# Patient Record
Sex: FEMALE
[De-identification: names, ages, dates, MRNs, and addresses within clinical notes are randomized per-mention and may not be internally consistent; named-entity substitution may affect disease eponyms.]

---

## 2017-03-16 ENCOUNTER — LAB SERVICES (OUTPATIENT)
Dept: OTHER | Age: 37
End: 2017-03-16

## 2017-03-16 LAB
CRP SERPL-MCNC: 1.7 MG/DL (ref 0–1)
SEDIMENTATION RATE, RBC: 41 MM/H (ref 0–20)

## 2017-03-17 LAB
ALBUMIN SERPL BCG-MCNC: 4.1 G/DL (ref 3.6–5.1)
ALP SERPL-CCNC: 91 U/L (ref 45–105)
ALT SERPL W/O P-5'-P-CCNC: 30 U/L (ref 15–43)
AST SERPL-CCNC: 17 U/L (ref 14–43)
BILIRUB SERPL-MCNC: 0.4 MG/DL (ref 0–1.3)
BUN SERPL-MCNC: 13 MG/DL (ref 7–20)
CALCIUM SERPL-MCNC: 9 MG/DL (ref 8.6–10.6)
CHLORIDE SERPL-SCNC: 102 MMOL/L (ref 96–107)
CREATININE, SERUM: 0.6 MG/DL (ref 0.5–1.4)
DIFFERENTIAL TYPE: ABNORMAL
GFR SERPL CREATININE-BSD FRML MDRD: >60 ML/MIN/{1.73M2}
GFR SERPL CREATININE-BSD FRML MDRD: >60 ML/MIN/{1.73M2}
GLUCOSE SERPL-MCNC: 90 MG/DL (ref 70–200)
HCO3 SERPL-SCNC: 28 MMOL/L (ref 22–32)
HEMATOCRIT: 42.3 % (ref 34–45)
HEMOGLOBIN: 14.1 G/DL (ref 11.2–15.7)
LYMPH PERCENT: 48.8 % (ref 20.5–51.1)
LYMPHOCYTE ABSOLUTE #: 4.9 10*3/UL (ref 1.2–3.4)
MEAN CORPUSCULAR HGB CONCENTRATION: 33.3 % (ref 32–36)
MEAN CORPUSCULAR HGB: 29.3 PG (ref 27–34)
MEAN CORPUSCULAR VOLUME: 87.8 FL (ref 79–95)
MEAN PLATELET VOLUME: 9.9 FL (ref 8.6–12.4)
MIXED %: 9.3 % (ref 4.3–12.9)
MIXED ABSOLUTE #: 0.9 10*3/UL (ref 0.2–0.9)
NEUTROPHIL ABSOLUTE #: 4.3 10*3/UL (ref 1.4–6.5)
NEUTROPHIL PERCENT: 41.9 % (ref 34–73.5)
PLATELET COUNT: 319 10*3/UL (ref 150–400)
POTASSIUM SERPL-SCNC: 4.1 MMOL/L (ref 3.5–5.3)
PROT SERPL-MCNC: 7.5 G/DL (ref 6.4–8.5)
RED BLOOD CELL COUNT: 4.82 10*6/UL (ref 3.7–5.2)
RED CELL DISTRIBUTION WIDTH: 12.7 % (ref 11.3–14.8)
SODIUM SERPL-SCNC: 142 MMOL/L (ref 136–146)
WHITE BLOOD CELL COUNT: 10.1 10*3/UL (ref 4–10)

## 2017-03-20 ENCOUNTER — CHARTING TRANS (OUTPATIENT)
Dept: RHEUMATOLOGY | Age: 37
End: 2017-03-20

## 2017-06-08 ENCOUNTER — LAB SERVICES (OUTPATIENT)
Dept: OTHER | Age: 37
End: 2017-06-08

## 2017-06-08 LAB
CRP SERPL-MCNC: 2.4 MG/DL (ref 0–1)
SEDIMENTATION RATE, RBC: 55 MM/H (ref 0–20)

## 2017-06-12 ENCOUNTER — CHARTING TRANS (OUTPATIENT)
Dept: RHEUMATOLOGY | Age: 37
End: 2017-06-12

## 2018-07-26 ENCOUNTER — HOSPITAL ENCOUNTER (EMERGENCY)
Facility: HOSPITAL | Age: 38
Discharge: HOME | End: 2018-07-27
Attending: STUDENT IN AN ORGANIZED HEALTH CARE EDUCATION/TRAINING PROGRAM
Payer: COMMERCIAL

## 2018-07-26 DIAGNOSIS — N83.201 RIGHT OVARIAN CYST: Primary | ICD-10-CM

## 2018-07-26 DIAGNOSIS — K59.00 CONSTIPATION, UNSPECIFIED CONSTIPATION TYPE: ICD-10-CM

## 2018-07-26 DIAGNOSIS — M54.9 BACK PAIN WITH RADIATION: ICD-10-CM

## 2018-07-26 DIAGNOSIS — R10.9 ABDOMINAL PAIN, ACUTE: ICD-10-CM

## 2018-07-26 LAB
ALBUMIN SERPL-MCNC: 3.1 G/DL (ref 3.5–4.8)
ALBUMIN/GLOB SERPL: 0.8 {RATIO} (ref 1–2)
ALP LIVER SERPL-CCNC: 82 U/L (ref 37–98)
ALT SERPL-CCNC: 16 U/L (ref 14–54)
ANION GAP SERPL CALC-SCNC: 6 MMOL/L (ref 0–18)
AST SERPL-CCNC: 7 U/L (ref 15–41)
BASOPHILS # BLD AUTO: 0.03 X10(3) UL (ref 0–0.1)
BASOPHILS NFR BLD AUTO: 0.3 %
BILIRUB SERPL-MCNC: 0.3 MG/DL (ref 0.1–2)
BILIRUB UR QL STRIP.AUTO: NEGATIVE
BUN BLD-MCNC: 8 MG/DL (ref 8–20)
BUN/CREAT SERPL: 12.1 (ref 10–20)
CALCIUM BLD-MCNC: 8.5 MG/DL (ref 8.3–10.3)
CHLORIDE SERPL-SCNC: 106 MMOL/L (ref 101–111)
CO2 SERPL-SCNC: 28 MMOL/L (ref 22–32)
COLOR UR AUTO: YELLOW
CREAT BLD-MCNC: 0.66 MG/DL (ref 0.55–1.02)
EOSINOPHIL # BLD AUTO: 0.21 X10(3) UL (ref 0–0.3)
EOSINOPHIL NFR BLD AUTO: 2 %
ERYTHROCYTE [DISTWIDTH] IN BLOOD BY AUTOMATED COUNT: 13.7 % (ref 11.5–16)
GLOBULIN PLAS-MCNC: 4.1 G/DL (ref 2.5–3.7)
GLUCOSE BLD-MCNC: 98 MG/DL (ref 70–99)
GLUCOSE UR STRIP.AUTO-MCNC: NEGATIVE MG/DL
HCT VFR BLD AUTO: 39.1 % (ref 34–50)
HGB BLD-MCNC: 12.8 G/DL (ref 12–16)
IMMATURE GRANULOCYTE COUNT: 0.03 X10(3) UL (ref 0–1)
IMMATURE GRANULOCYTE RATIO %: 0.3 %
KETONES UR STRIP.AUTO-MCNC: NEGATIVE MG/DL
LIPASE: 86 U/L (ref 73–393)
LYMPHOCYTES # BLD AUTO: 4.89 X10(3) UL (ref 0.9–4)
LYMPHOCYTES NFR BLD AUTO: 46.7 %
M PROTEIN MFR SERPL ELPH: 7.2 G/DL (ref 6.1–8.3)
MCH RBC QN AUTO: 27.6 PG (ref 27–33.2)
MCHC RBC AUTO-ENTMCNC: 32.7 G/DL (ref 31–37)
MCV RBC AUTO: 84.3 FL (ref 81–100)
MONOCYTES # BLD AUTO: 0.47 X10(3) UL (ref 0.1–1)
MONOCYTES NFR BLD AUTO: 4.5 %
NEUTROPHIL ABS PRELIM: 4.84 X10 (3) UL (ref 1.3–6.7)
NEUTROPHILS # BLD AUTO: 4.84 X10(3) UL (ref 1.3–6.7)
NEUTROPHILS NFR BLD AUTO: 46.2 %
NITRITE UR QL STRIP.AUTO: NEGATIVE
OSMOLALITY SERPL CALC.SUM OF ELEC: 288 MOSM/KG (ref 275–295)
PH UR STRIP.AUTO: 6 [PH] (ref 4.5–8)
PLATELET # BLD AUTO: 340 10(3)UL (ref 150–450)
POCT URINE PREGNANCY: NEGATIVE
POTASSIUM SERPL-SCNC: 4.1 MMOL/L (ref 3.6–5.1)
PROT UR STRIP.AUTO-MCNC: NEGATIVE MG/DL
RBC # BLD AUTO: 4.64 X10(6)UL (ref 3.8–5.1)
RBC UR QL AUTO: NEGATIVE
RED CELL DISTRIBUTION WIDTH-SD: 41.6 FL (ref 35.1–46.3)
SODIUM SERPL-SCNC: 140 MMOL/L (ref 136–144)
SP GR UR STRIP.AUTO: 1.01 (ref 1–1.03)
UROBILINOGEN UR STRIP.AUTO-MCNC: <2 MG/DL
WBC # BLD AUTO: 10.5 X10(3) UL (ref 4–13)

## 2018-07-26 PROCEDURE — 96361 HYDRATE IV INFUSION ADD-ON: CPT

## 2018-07-26 PROCEDURE — 85025 COMPLETE CBC W/AUTO DIFF WBC: CPT | Performed by: STUDENT IN AN ORGANIZED HEALTH CARE EDUCATION/TRAINING PROGRAM

## 2018-07-26 PROCEDURE — 81025 URINE PREGNANCY TEST: CPT

## 2018-07-26 PROCEDURE — 80053 COMPREHEN METABOLIC PANEL: CPT | Performed by: STUDENT IN AN ORGANIZED HEALTH CARE EDUCATION/TRAINING PROGRAM

## 2018-07-26 PROCEDURE — 96375 TX/PRO/DX INJ NEW DRUG ADDON: CPT

## 2018-07-26 PROCEDURE — 96374 THER/PROPH/DIAG INJ IV PUSH: CPT

## 2018-07-26 PROCEDURE — 99284 EMERGENCY DEPT VISIT MOD MDM: CPT

## 2018-07-26 PROCEDURE — 87086 URINE CULTURE/COLONY COUNT: CPT | Performed by: STUDENT IN AN ORGANIZED HEALTH CARE EDUCATION/TRAINING PROGRAM

## 2018-07-26 PROCEDURE — 83690 ASSAY OF LIPASE: CPT | Performed by: STUDENT IN AN ORGANIZED HEALTH CARE EDUCATION/TRAINING PROGRAM

## 2018-07-26 PROCEDURE — 81001 URINALYSIS AUTO W/SCOPE: CPT | Performed by: STUDENT IN AN ORGANIZED HEALTH CARE EDUCATION/TRAINING PROGRAM

## 2018-07-26 RX ORDER — CLONAZEPAM 1 MG/1
1 TABLET ORAL 2 TIMES DAILY PRN
COMMUNITY
End: 2020-12-08

## 2018-07-26 RX ORDER — GUANFACINE 1 MG/1
1 TABLET ORAL NIGHTLY
COMMUNITY
End: 2018-11-13

## 2018-07-26 RX ORDER — PROPRANOLOL HYDROCHLORIDE 80 MG/1
80 TABLET ORAL 3 TIMES DAILY
COMMUNITY
End: 2020-12-08

## 2018-07-26 RX ORDER — LORAZEPAM 0.5 MG/1
0.5 TABLET ORAL EVERY 4 HOURS PRN
COMMUNITY

## 2018-07-26 RX ORDER — MORPHINE SULFATE 4 MG/ML
4 INJECTION, SOLUTION INTRAMUSCULAR; INTRAVENOUS ONCE
Status: COMPLETED | OUTPATIENT
Start: 2018-07-26 | End: 2018-07-26

## 2018-07-26 RX ORDER — KETOROLAC TROMETHAMINE 30 MG/ML
15 INJECTION, SOLUTION INTRAMUSCULAR; INTRAVENOUS ONCE
Status: COMPLETED | OUTPATIENT
Start: 2018-07-26 | End: 2018-07-26

## 2018-07-26 RX ORDER — ONDANSETRON 2 MG/ML
4 INJECTION INTRAMUSCULAR; INTRAVENOUS ONCE
Status: COMPLETED | OUTPATIENT
Start: 2018-07-26 | End: 2018-07-26

## 2018-07-27 ENCOUNTER — APPOINTMENT (OUTPATIENT)
Dept: CT IMAGING | Facility: HOSPITAL | Age: 38
End: 2018-07-27
Attending: STUDENT IN AN ORGANIZED HEALTH CARE EDUCATION/TRAINING PROGRAM
Payer: COMMERCIAL

## 2018-07-27 VITALS
HEART RATE: 57 BPM | BODY MASS INDEX: 44.98 KG/M2 | DIASTOLIC BLOOD PRESSURE: 77 MMHG | OXYGEN SATURATION: 99 % | HEIGHT: 65 IN | RESPIRATION RATE: 16 BRPM | TEMPERATURE: 99 F | SYSTOLIC BLOOD PRESSURE: 115 MMHG | WEIGHT: 270 LBS

## 2018-07-27 PROCEDURE — 74177 CT ABD & PELVIS W/CONTRAST: CPT | Performed by: STUDENT IN AN ORGANIZED HEALTH CARE EDUCATION/TRAINING PROGRAM

## 2018-07-27 RX ORDER — POLYETHYLENE GLYCOL 3350 17 G/17G
17 POWDER, FOR SOLUTION ORAL DAILY PRN
Qty: 12 EACH | Refills: 0 | Status: SHIPPED | OUTPATIENT
Start: 2018-07-27 | End: 2018-08-26

## 2018-07-27 RX ORDER — IBUPROFEN 600 MG/1
600 TABLET ORAL EVERY 8 HOURS PRN
Qty: 30 TABLET | Refills: 0 | Status: SHIPPED | OUTPATIENT
Start: 2018-07-27 | End: 2018-08-03

## 2018-07-27 RX ORDER — HYDROCODONE BITARTRATE AND ACETAMINOPHEN 5; 325 MG/1; MG/1
1-2 TABLET ORAL EVERY 4 HOURS PRN
Qty: 10 TABLET | Refills: 0 | Status: SHIPPED | OUTPATIENT
Start: 2018-07-27 | End: 2018-08-03

## 2018-07-27 NOTE — ED INITIAL ASSESSMENT (HPI)
Patient c/o right flank pain started a few weeks ago. Pt sts the last few days her thigh is on \" fire\". Denies n/v/d. Pt was seen by PMD and was given flexeril without relief. Denies  symptoms.

## 2018-07-27 NOTE — ED PROVIDER NOTES
Patient Seen in: [facility] Emergency Department    History   Patient presents with:  Abdomen/Flank Pain (GI/)    Stated Complaint: right side flank pain    HPI    Patient is a 42-year-old female presenting to emergency department reporting right- Normocephalic. Nose: Nose normal.   Mouth/Throat: Oropharynx is clear and moist.   Eyes: Conjunctivae and EOM are normal. Pupils are equal, round, and reactive to light. Neck: Normal range of motion and full passive range of motion without pain.  Neck s CBC W/ DIFFERENTIAL[[id number]]          Abnormal            Final result                 Please view results for these tests on the individual orders.    POCT PREGNANCY, URINE   RAINBOW DRAW BLUE   RAINBOW DRAW LAVENDER   RAINBOW DRAW LIGHT GREE Tab  Take 1-2 tablets by mouth every 4 (four) hours as needed for Pain. Qty: 10 tablet Refills: 0    PEG 3350 Oral Powd Pack  Take 17 g by mouth daily as needed.   Qty: 12 each Refills: 0    ibuprofen 600 MG Oral Tab  Take 1 tablet (600 mg total) by mouth

## 2018-10-30 ENCOUNTER — LAB REQUISITION (OUTPATIENT)
Dept: ADMINISTRATIVE | Age: 38
End: 2018-10-30
Payer: COMMERCIAL

## 2018-10-30 DIAGNOSIS — F33.2 ENDOGENOUS DEPRESSION (HCC): ICD-10-CM

## 2018-10-31 PROCEDURE — 84443 ASSAY THYROID STIM HORMONE: CPT | Performed by: OTHER

## 2018-10-31 PROCEDURE — 85025 COMPLETE CBC W/AUTO DIFF WBC: CPT | Performed by: OTHER

## 2018-10-31 PROCEDURE — 36415 COLL VENOUS BLD VENIPUNCTURE: CPT | Performed by: OTHER

## 2018-10-31 PROCEDURE — 80053 COMPREHEN METABOLIC PANEL: CPT | Performed by: OTHER

## 2018-11-13 NOTE — ED PROVIDER NOTES
Patient Seen in: [facility] In [place]    History   Patient presents with:  Eval-P (psychiatric)    Stated Complaint: Eval p    HPI    61-year-old female complaining of suicidal ideation.   The patient has a history of PTSD was recentl Packs/day: 0.50        Years: 6.00        Pack years: 3      Smokeless tobacco: Never Used    Alcohol use:  Yes      Alcohol/week: 3.6 oz      Types: 6 Glasses of wine per week      Frequency: Monthly or less      Drinks per session: 1 or 2      Binge Abnormal; Notable for the following components:    Lymphocyte Absolute 4.53 (*)     All other components within normal limits   ETHYL ALCOHOL - Normal   DRUG SCREEN 7 W/OUT CONFIRMATION, URINE - Normal    Narrative:     Results of the Urine Drug Screen [place]

## 2018-11-13 NOTE — BH PROGRESS NOTE
TAMI assessment completed. ER informed pt will need medical clearance and certificate by ER MD completed in order for [facility] to find placement for pt. ER to call [phone] [phone] once pt is medically clear.

## 2018-11-13 NOTE — BH PROGRESS NOTE
Petition scanned into [facility] [address] ER contacted and asked to put consult in for ER. ER is aware pt doesn't need to be assessed by [facility]. TAMI assessment completed.

## 2018-11-13 NOTE — BH LEVEL OF CARE ASSESSMENT
Level of Care Assessment Note                   Level of Care Assessment Note    General Questions  Why are you here?: Pt was referred by [facility] ()  to [facility] after insurance denied.  Pt had been in residential tx there since 10/25/18 thru 11/9/18 time and  \"I can't believe [facility] let her go home in this state. \"  Pt wife is very supportive of pt and wants her to get better and  stabilized. Family's Biggest Areas of Concern: \"She is so depressed that she is not functioing.  I have to give her her med to end your life? (lifetime): Yes  7. How long ago did you do any of these?: Within the last three months  Score -  OV: 7 - High Risk   Describe : pt reports today had visions of hanging herself.  Pt wife said while she was showering today had thoughts to ago    Access to Means  Has access to means to attempt suicide or harm others or property: Yes  Description of Access: per wife, \"I keep all meds in a locked cabinet and I handle all of her medication. \"  Discussion of Removal of Access to Means: pt has n managment  Effectiveness: per wife, doesnt feel pt meds are right  Current Therapist  Current Therapist: none, but has seen multiple therapists in the past        Prior Other Inpatient  Name: none     Prior Residential  Name: [facility] for Resident Yes  Describe Concerns/Conflicts with Social Relationships[de-identified] pt reports daughter stopped talking to her for awhile. Decreased Functional Ability: Complete ADLs; Clean house;Driving;Pay bills;Grocery shop;Cook(pt showeirng 1 x per week)  Do you have any pr Summary  Assessment Summary: Pt is 40 y/o female who was referred by [facility] after insurance refused to cover residential treatment. Pt was admitted to residential from 10/25 to 11/9 at . Pt reports hx of depression, anxiety and PTSD.  Pt here this time. Level of Care Recommendation: Inpatient Acute Care  Unit: Adult  Reason for Unit Assigned: age/dx  Inpatient Criteria: 24 hr behavior monitoring;Suicidal/homicidal risk; Severely decreased function; Inability to care for self  Behavioral [place]

## 2018-11-14 NOTE — ED NOTES
Public safety was called  They brought the one patients belonging bag to [facility]

## 2018-11-14 NOTE — ED NOTES
Bedside report given to UMER Monte. Patient sleeping at this time. It was reported off to Mell that all home medications were discussed with patient and patients spouse and to please have Pharmacy verify home medications.

## 2018-11-14 NOTE — ED NOTES
Spoke with Yelitza @ [facility]. No new information regarding PT admission. Still looking for placement.

## 2018-11-14 NOTE — ED NOTES
Received call from [place] at [facility] [place] who gave the ok to transport. Please remember to include original P and C with ambulance. Spoke to Jac, aware of above.

## 2018-11-14 NOTE — ED NOTES
Spoke to Baljit at [facility] for a nurse to nurse report. Baljit stated that they would call back after speaking to the MD if they are going to accept patient for placement.

## 2018-11-14 NOTE — ED NOTES
[facility] to go to [facility]  Dr Negro Vaughn is accepting   Will be going to [facility]  Was calm/cooperative during transport

## 2018-11-14 NOTE — ED NOTES
Spoke with Gilford Siskin at [facility] # [phone]  Authorized 5 days: 11/14 - 11/18  Review will be on 11/19    Auth # [id number]    [employer] request that they be contacted once pt is admitted @ [phone]

## 2018-11-14 NOTE — ED NOTES
RN to bedside for morning vitals. Patient sleeping at this time. RN offered warm blanket, bathroom, and fresh water. Patient states at this time she is content. Will continue to monitor.

## 2018-11-14 NOTE — ED NOTES
[facility] notified that pt is requesting not to go to [facility] for admission. TAMI states still working on placement for pt. Pt's wife left to go get food for patient.

## 2018-11-14 NOTE — ED NOTES
Patient arrived via EMS from [facility]. Patient calm and cooperative. Patient requesting her normal night time medications (lyrica, cymbalta, and seroquil.). Rn asked patient if she knew the doses and stated she did not.  Patient requesting we call her significan

## 2018-11-28 VITALS
HEART RATE: 64 BPM | WEIGHT: 248 LBS | BODY MASS INDEX: 42.34 KG/M2 | DIASTOLIC BLOOD PRESSURE: 72 MMHG | RESPIRATION RATE: 16 BRPM | SYSTOLIC BLOOD PRESSURE: 110 MMHG | HEIGHT: 64 IN

## 2018-11-29 VITALS
RESPIRATION RATE: 16 BRPM | HEIGHT: 64 IN | SYSTOLIC BLOOD PRESSURE: 112 MMHG | WEIGHT: 250 LBS | BODY MASS INDEX: 42.68 KG/M2 | HEART RATE: 72 BPM | DIASTOLIC BLOOD PRESSURE: 74 MMHG

## 2019-10-02 ENCOUNTER — TELEPHONE (OUTPATIENT)
Dept: BEHAVIORAL HEALTH | Age: 39
End: 2019-10-02

## 2019-10-14 ENCOUNTER — APPOINTMENT (OUTPATIENT)
Dept: BEHAVIORAL HEALTH | Age: 39
End: 2019-10-14

## 2020-06-10 ENCOUNTER — TELEPHONE (OUTPATIENT)
Dept: FAMILY MEDICINE CLINIC | Facility: CLINIC | Age: 40
End: 2020-06-10

## 2020-06-10 NOTE — TELEPHONE ENCOUNTER
RECEIVED MEDICAL RECORD REQUEST FROM [place] [employer] REQUESTING RECORDS FROM 09/01/2017 TO PRESENT.     THIS IS NOT ONE OF OUR PTS IN OUR PRACTICE    SENT TO SCAN STAT

## 2020-12-02 PROBLEM — R68.89: Status: ACTIVE | Noted: 2020-12-02

## 2020-12-02 PROBLEM — F41.1 GENERALIZED ANXIETY DISORDER: Status: ACTIVE | Noted: 2019-08-27

## 2020-12-02 PROBLEM — F43.12 CHRONIC POST-TRAUMATIC STRESS DISORDER: Status: ACTIVE | Noted: 2020-12-02

## 2020-12-02 PROBLEM — F31.9 BIPOLAR DISORDER (HCC): Status: ACTIVE | Noted: 2020-12-02

## 2021-06-08 PROBLEM — G47.33 OSA (OBSTRUCTIVE SLEEP APNEA): Status: ACTIVE | Noted: 2021-06-08

## 2021-06-08 PROBLEM — R41.840 DIFFICULTY CONCENTRATING: Status: ACTIVE | Noted: 2021-06-08

## 2021-08-03 PROBLEM — E66.1 CLASS 3 DRUG-INDUCED OBESITY WITH SERIOUS COMORBIDITY AND BODY MASS INDEX (BMI) OF 50.0 TO 59.9 IN ADULT (HCC): Status: ACTIVE | Noted: 2021-08-03

## 2021-08-11 PROBLEM — R68.89: Status: RESOLVED | Noted: 2020-12-02 | Resolved: 2021-08-11

## (undated) NOTE — ED AVS SNAPSHOT
Maile Suazo   MRN: [id number]    Department:  [facility] Emergency Department   Date of Visit:  7/26/2018           Disclosure     Insurance plans vary and the physician(s) referred by the ER may not be covered by your plan.  Please contact your in tell this physician (or your personal doctor if your instructions are to return to your personal doctor) about any new or lasting problems. The primary care or specialist physician will see patients referred from the [facility] Emergency Department.  Marychuy Baptiste